# Patient Record
Sex: MALE | Race: WHITE | NOT HISPANIC OR LATINO | Employment: OTHER | URBAN - METROPOLITAN AREA
[De-identification: names, ages, dates, MRNs, and addresses within clinical notes are randomized per-mention and may not be internally consistent; named-entity substitution may affect disease eponyms.]

---

## 2022-05-25 ENCOUNTER — HOSPITAL ENCOUNTER (EMERGENCY)
Facility: HOSPITAL | Age: 52
Discharge: HOME | End: 2022-05-25
Attending: EMERGENCY MEDICINE | Admitting: EMERGENCY MEDICINE
Payer: MEDICAID

## 2022-05-25 VITALS
TEMPERATURE: 98.2 F | RESPIRATION RATE: 16 BRPM | BODY MASS INDEX: 29.84 KG/M2 | DIASTOLIC BLOOD PRESSURE: 89 MMHG | OXYGEN SATURATION: 100 % | SYSTOLIC BLOOD PRESSURE: 190 MMHG | HEART RATE: 86 BPM | HEIGHT: 75 IN | WEIGHT: 240 LBS

## 2022-05-25 DIAGNOSIS — R03.0 ELEVATED BLOOD PRESSURE READING WITHOUT DIAGNOSIS OF HYPERTENSION: ICD-10-CM

## 2022-05-25 DIAGNOSIS — K08.89 PAIN, DENTAL: ICD-10-CM

## 2022-05-25 PROBLEM — F10.11 HISTORY OF ETOH ABUSE: Status: ACTIVE | Noted: 2018-01-02

## 2022-05-25 PROCEDURE — 250N000013 HC RX MED GY IP 250 OP 250 PS 637: Performed by: EMERGENCY MEDICINE

## 2022-05-25 PROCEDURE — 99283 EMERGENCY DEPT VISIT LOW MDM: CPT

## 2022-05-25 RX ORDER — CLINDAMYCIN HCL 150 MG
450 CAPSULE ORAL ONCE
Status: COMPLETED | OUTPATIENT
Start: 2022-05-25 | End: 2022-05-25

## 2022-05-25 RX ORDER — CLINDAMYCIN HCL 150 MG
450 CAPSULE ORAL 3 TIMES DAILY
Qty: 90 CAPSULE | Refills: 0 | Status: SHIPPED | OUTPATIENT
Start: 2022-05-25 | End: 2022-06-04

## 2022-05-25 RX ADMIN — CLINDAMYCIN HYDROCHLORIDE 450 MG: 150 CAPSULE ORAL at 11:39

## 2022-05-25 ASSESSMENT — ENCOUNTER SYMPTOMS
HEADACHES: 0
CHILLS: 0
FEVER: 0
FATIGUE: 0
ABDOMINAL PAIN: 0
SHORTNESS OF BREATH: 0
DIFFICULTY URINATING: 0
SINUS PRESSURE: 0
CHOKING: 0
TROUBLE SWALLOWING: 0
STRIDOR: 0
COUGH: 0
DIAPHORESIS: 0
COLOR CHANGE: 0
SINUS PAIN: 0
ARTHRALGIAS: 0
EYE REDNESS: 0
FACIAL SWELLING: 1
WHEEZING: 0
CONFUSION: 0
NECK STIFFNESS: 0
SORE THROAT: 0

## 2022-05-25 NOTE — ED TRIAGE NOTES
Pt here with left upper dental pain that has been ongoing for about a month. He has seen the dentist and 2 rounds of antibiotics. Causing pain and increasing over the last week.     Triage Assessment     Row Name 05/25/22 1126       Triage Assessment (Adult)    Airway WDL WDL       Respiratory WDL    Respiratory WDL WDL       Skin Circulation/Temperature WDL    Skin Circulation/Temperature WDL WDL       Peripheral/Neurovascular WDL    Peripheral Neurovascular WDL WDL       Cognitive/Neuro/Behavioral WDL    Cognitive/Neuro/Behavioral WDL WDL

## 2022-05-25 NOTE — DISCHARGE INSTRUCTIONS
Take the antibiotic (clindamycin) as prescribed for your dental infection.    As needed for pain control at home, take:  - over-the-counter ibuprofen 800mg by mouth every 8 hours (max dose 2400mg in 24 hours)  - over-the-counter acetaminophen 1g by mouth every 6 hours (max dose 4g in 24 hours)    Follow up with dentist as soon as possible for a recheck.    Return to the Emergency Department for any difficulty breathing, inability to walk, severe worsening, or any other concerns.

## 2022-05-25 NOTE — ED PROVIDER NOTES
EMERGENCY DEPARTMENT ENCOUNTER      NAME: Dat Teague  AGE: 52 year old male  YOB: 1970  MRN: [id number]  EVALUATION DATE & TIME: No admission date for patient encounter.    PCP: No primary care provider on file.    ED PROVIDER: Eliseo Schmidt M.D.      Chief Complaint   Patient presents with     Dental Problem         IMPRESSION  1. Pain, dental    2. Elevated blood pressure reading without diagnosis of hypertension        PLAN  - clindamycin 450mg q8h x10 days  - NSAIDs for pain  - close dental clinic follow up  - discharge to home    ED COURSE & MEDICAL DECISION MAKING         --------------------------------------------------------------------------------   --------------------------------------------------------------------------------       52yoM presenting with about 1 month of left upper dental pain (front tooth). Has seen dentist 2 times; took amoxicillin & then Zpak. States he had left face swelling but this improved with Zpak. Now pain back so concerned about repeat infection; states dentist told him to come to the ED if pain returns. Denies any fevers, sweats, chills, difficulty breathing, inability to swallow, fevers, sweats, chills, nausea, vomiting. States pain is mild currently; not looking for pain meds.    SBP 190s on presentation with otherwise normal vitals. Calm on exam with mild tenderness to tooth #9 with no surrounding gum changes, no fluctuance, no ulceration, palate unremarkable, floor of mouth soft, normal phonation, no stridor, handling secretions without difficulty, no anterior neck tenderness, no meningismus, no cheek swelling/tenderness/erythema. Exam otherwise unremarkable. Has mild unremarkable dental tenderness. No concern for deep space neck infection, impending airway, deep midface infection, sepsis. No indication for IV antibiotics at this time. Given PO clindamycin here now and will continue at home. Patient able to tolerate PO and walk without difficulty.  Patient comfortable with discharge at this time. Return precautions and need for dentist follow up discussed and understood. No further questions at the time of discharge.    Of note, patient's BP high but no suggestion of HTN emergency. No suggestion of alcohol withdrawal either. Patient voiced understanding and agreement to follow up with PCP about his high blood pressure.      This patient involved a high degree of complexity in medical decision making, as significant risks were present and assessed.    Broad differential considered for this patient presenting, including but not limited to:  Dental pain, periapical abscess, Harry's, deep space neck infection, impending airway, mucormycosis, HTN emergency, asymptomatic HTN    I wore the following PPE during this patient encounter:  N95 mask, face shield w/ eye protection, gloves    MEDICATIONS GIVEN IN THE EMERGENCY DEPARTMENT  Medications   clindamycin (CLEOCIN) capsule 450 mg (has no administration in time range)       NEW PRESCRIPTIONS STARTED AT TODAY'S ER VISIT  Current Discharge Medication List      START taking these medications    Details   clindamycin (CLEOCIN) 150 MG capsule Take 3 capsules (450 mg) by mouth 3 times daily for 10 days  Qty: 90 capsule, Refills: 0                 =================================================================      HPI  Dat Teague is a 52 year old male presenting with about 1 month of left upper dental pain (front tooth). Has seen dentist 2 times; took amoxicillin & then Zpak. States he had left face swelling but this improved with Zpak. Now pain back so concerned about repeat infection; states dentist told him to come to the ED if pain returns. Denies any fevers, sweats, chills, difficulty breathing, inability to swallow, fevers, sweats, chills, nausea, vomiting. States pain is mild currently; not looking for pain meds.      REVIEW OF SYSTEMS   Review of Systems   Constitutional: Negative for chills,  diaphoresis, fatigue and fever.   HENT: Positive for dental problem (left front too pain) and facial swelling (left, resolved now). Negative for congestion, drooling, sinus pressure, sinus pain, sore throat and trouble swallowing.    Eyes: Negative for redness.   Respiratory: Negative for cough, choking, shortness of breath, wheezing and stridor.    Cardiovascular: Negative for chest pain.   Gastrointestinal: Negative for abdominal pain.   Genitourinary: Negative for difficulty urinating.   Musculoskeletal: Negative for arthralgias and neck stiffness.   Skin: Negative for color change.   Neurological: Negative for headaches.   Psychiatric/Behavioral: Negative for confusion.   All other systems reviewed and are negative.    All other systems reviewed and are negative except as noted above in HPI.        --------------- MEDICAL HISTORY ---------------  PAST MEDICAL HISTORY:  No past medical history on file.  Patient Active Problem List   Diagnosis     Depression with anxiety     History of ETOH abuse     Hyperhidrosis     Overweight (BMI 25.0-29.9)     Personal history of tobacco use, presenting hazards to health       PAST SURGICAL HISTORY:  Reviewed. No pertinent past surgical history.    CURRENT MEDICATIONS:      Current Facility-Administered Medications:      clindamycin (CLEOCIN) capsule 450 mg, 450 mg, Oral, Once, Eliseo Schmidt MD    Current Outpatient Medications:      clindamycin (CLEOCIN) 150 MG capsule, Take 3 capsules (450 mg) by mouth 3 times daily for 10 days, Disp: 90 capsule, Rfl: 0    ALLERGIES:  No Known Allergies    FAMILY HISTORY:  Reviewed. No pertinent past family history.    SOCIAL HISTORY:   Social History     Socioeconomic History     Marital status: Single         --------------- PHYSICAL EXAM ---------------  Nursing notes and vitals reviewed by me.  VITALS:  Vitals:    05/25/22 1124   BP: (!) 190/89   Pulse: 86   Resp: 16   Temp: 98.2  F (36.8  C)   TempSrc: Temporal   SpO2: 100%  "  Weight: 108.9 kg (240 lb)   Height: 1.905 m (6' 3\")       PHYSICAL EXAM:    General:  alert, interactive, no distress  Eyes:  conjunctivae clear, conjugate gaze  HENT:  mild tenderness to tooth #9 with no surrounding gum changes, no fluctuance, no ulceration, palate unremarkable, floor of mouth soft, normal phonation, no stridor, handling secretions without difficulty, no anterior neck tenderness, no meningismus, no cheek swelling/tenderness/erythema, no sinus tenderness or rhinorrhea  Neck:  no meningismus  Cardiovascular:  HR 80s during exam, regular rhythm, no murmurs, brisk cap refill  Chest:  no chest wall tenderness  Pulmonary:  no stridor, normal phonation, normal work of breathing, clear lungs bilaterally  Abdomen:  soft, nondistended, nontender  :  no CVA tenderness  Back:  no midline spinal tenderness  Musculoskeletal:  no pretibial edema, no calf tenderness. Gross ROM intact to joints of extremities with no obvious deformities.  Skin:  warm, dry, no rash  Neuro:  awake, alert, answers questions appropriately, follows commands, moves all limbs  Psych:  calm, normal affect            Eliseo Schmidt MD  05/25/22  Emergency Medicine  [facility] EMERGENCY DEPARTMENT  [facility] [facility] [address] [phone]  Dept: [phone]     Eliseo Schmidt MD  05/25/22 1138    "